# Patient Record
Sex: FEMALE | RURAL
[De-identification: names, ages, dates, MRNs, and addresses within clinical notes are randomized per-mention and may not be internally consistent; named-entity substitution may affect disease eponyms.]

---

## 2022-01-18 ENCOUNTER — APPOINTMENT (OUTPATIENT)
Dept: RURAL CLINIC 11 | Age: 47
Setting detail: DERMATOLOGY
End: 2022-01-18

## 2022-01-18 DIAGNOSIS — L82.1 OTHER SEBORRHEIC KERATOSIS: ICD-10-CM

## 2022-01-18 DIAGNOSIS — Z41.9 ENCOUNTER FOR PROCEDURE FOR PURPOSES OTHER THAN REMEDYING HEALTH STATE, UNSPECIFIED: ICD-10-CM

## 2022-01-18 DIAGNOSIS — L98.8 OTHER SPECIFIED DISORDERS OF THE SKIN AND SUBCUTANEOUS TISSUE: ICD-10-CM

## 2022-01-18 DIAGNOSIS — L71.8 OTHER ROSACEA: ICD-10-CM

## 2022-01-18 DIAGNOSIS — D18.0 HEMANGIOMA: ICD-10-CM

## 2022-01-18 PROBLEM — D18.01 HEMANGIOMA OF SKIN AND SUBCUTANEOUS TISSUE: Status: ACTIVE | Noted: 2022-01-18

## 2022-01-18 PROCEDURE — OTHER MIPS QUALITY: OTHER

## 2022-01-18 PROCEDURE — OTHER SUNSCREEN RECOMMENDATIONS: OTHER

## 2022-01-18 PROCEDURE — OTHER GENTLE SKIN CARE INSTRUCTIONS: OTHER

## 2022-01-18 PROCEDURE — 99203 OFFICE O/P NEW LOW 30 MIN: CPT

## 2022-01-18 PROCEDURE — OTHER PRESCRIPTION: OTHER

## 2022-01-18 PROCEDURE — OTHER OTHER (COSMETIC): OTHER

## 2022-01-18 PROCEDURE — OTHER COUNSELING: OTHER

## 2022-01-18 PROCEDURE — OTHER REASSURANCE: OTHER

## 2022-01-18 PROCEDURE — OTHER ADDITIONAL NOTES: OTHER

## 2022-01-18 PROCEDURE — OTHER MEDICAL CONSULTATION: DYNAMIC RHYTIDES: OTHER

## 2022-01-18 PROCEDURE — OTHER BOTOX: OTHER

## 2022-01-18 PROCEDURE — OTHER RECOMMENDATIONS: OTHER

## 2022-01-18 PROCEDURE — OTHER COSMETIC CONSULTATION: FILLERS: OTHER

## 2022-01-18 RX ORDER — AZELAIC ACID 0.15 G/G
PEA SIZE GEL TOPICAL BID
Qty: 50 | Refills: 5 | Status: ERX | COMMUNITY
Start: 2022-01-18

## 2022-01-18 ASSESSMENT — LOCATION SIMPLE DESCRIPTION DERM
LOCATION SIMPLE: SUPERIOR FOREHEAD
LOCATION SIMPLE: RIGHT FOREHEAD
LOCATION SIMPLE: RIGHT CHEEK
LOCATION SIMPLE: LEFT FOREHEAD
LOCATION SIMPLE: LEFT UPPER ARM
LOCATION SIMPLE: RIGHT UPPER ARM
LOCATION SIMPLE: CHEST
LOCATION SIMPLE: GLABELLA
LOCATION SIMPLE: LEFT CHEEK

## 2022-01-18 ASSESSMENT — LOCATION ZONE DERM
LOCATION ZONE: TRUNK
LOCATION ZONE: ARM
LOCATION ZONE: FACE

## 2022-01-18 ASSESSMENT — LOCATION DETAILED DESCRIPTION DERM
LOCATION DETAILED: RIGHT CENTRAL MALAR CHEEK
LOCATION DETAILED: LEFT INFERIOR MEDIAL FOREHEAD
LOCATION DETAILED: SUPERIOR MID FOREHEAD
LOCATION DETAILED: UPPER STERNUM
LOCATION DETAILED: LEFT ANTERIOR PROXIMAL UPPER ARM
LOCATION DETAILED: LEFT LATERAL BUCCAL CHEEK
LOCATION DETAILED: RIGHT INFERIOR MEDIAL FOREHEAD
LOCATION DETAILED: LEFT INFERIOR CENTRAL MALAR CHEEK
LOCATION DETAILED: RIGHT INFERIOR CENTRAL MALAR CHEEK
LOCATION DETAILED: LEFT CENTRAL MALAR CHEEK
LOCATION DETAILED: GLABELLA
LOCATION DETAILED: RIGHT ANTERIOR DISTAL UPPER ARM

## 2022-01-18 ASSESSMENT — PAIN INTENSITY VAS: HOW INTENSE IS YOUR PAIN 0 BEING NO PAIN, 10 BEING THE MOST SEVERE PAIN POSSIBLE?: NO PAIN

## 2022-01-18 NOTE — PROCEDURE: REASSURANCE
Include Location In Plan?: No
Additional Note: Advised no treatment is necessary. Treatment would be considered cosmetic when lesions are asymptomatic
Detail Level: Zone

## 2022-01-18 NOTE — PROCEDURE: MIPS QUALITY
Quality 110: Preventive Care And Screening: Influenza Immunization: Influenza Immunization Ordered or Recommended, but not Administered due to system reason
Quality 130: Documentation Of Current Medications In The Medical Record: Current Medications Documented
Detail Level: Detailed
Quality 226: Preventive Care And Screening: Tobacco Use: Screening And Cessation Intervention: Patient screened for tobacco use and is an ex/non-smoker

## 2022-01-18 NOTE — PROCEDURE: BOTOX
Show Glabellar Units: Yes
[facility] Units: 0
Show Right And Left Periorbital Units: No
Detail Level: Detailed
Consent: Written consent obtained. Risks include but not limited to lid/brow ptosis, bruising, swelling, diplopia, temporary effect, incomplete chemical denervation.
Post-Care Instructions: Patient instructed to not lie down for 4 hours and limit physical activity for 24 hours. Patient instructed not to travel by airplane for 48 hours.
Dilution (U/0.1 Cc): 1

## 2022-01-18 NOTE — PROCEDURE: ADDITIONAL NOTES
Additional Notes: See scanned attachment
Render Risk Assessment In Note?: no
Detail Level: Simple
Additional Notes: Dharmesh diop

## 2022-02-11 ENCOUNTER — APPOINTMENT (OUTPATIENT)
Dept: RURAL CLINIC 11 | Age: 47
Setting detail: DERMATOLOGY
End: 2022-02-11

## 2022-02-11 DIAGNOSIS — Z41.9 ENCOUNTER FOR PROCEDURE FOR PURPOSES OTHER THAN REMEDYING HEALTH STATE, UNSPECIFIED: ICD-10-CM

## 2022-02-11 DIAGNOSIS — L98.8 OTHER SPECIFIED DISORDERS OF THE SKIN AND SUBCUTANEOUS TISSUE: ICD-10-CM

## 2022-02-11 PROCEDURE — OTHER BOTOX: OTHER

## 2022-02-11 PROCEDURE — OTHER MEDICAL CONSULTATION: DYNAMIC RHYTIDES: OTHER

## 2022-02-11 PROCEDURE — OTHER OTHER (COSMETIC): OTHER

## 2022-02-11 PROCEDURE — OTHER FILLERS: OTHER

## 2022-02-11 PROCEDURE — OTHER ADDITIONAL NOTES: OTHER

## 2022-02-11 ASSESSMENT — LOCATION SIMPLE DESCRIPTION DERM
LOCATION SIMPLE: SUPERIOR FOREHEAD
LOCATION SIMPLE: LEFT FOREHEAD
LOCATION SIMPLE: RIGHT FOREHEAD
LOCATION SIMPLE: RIGHT CHEEK
LOCATION SIMPLE: LEFT CHEEK
LOCATION SIMPLE: GLABELLA
LOCATION SIMPLE: RIGHT LIP

## 2022-02-11 ASSESSMENT — LOCATION DETAILED DESCRIPTION DERM
LOCATION DETAILED: RIGHT NASOLABIAL FOLD
LOCATION DETAILED: RIGHT INFERIOR MEDIAL MALAR CHEEK
LOCATION DETAILED: LEFT INFERIOR MEDIAL MALAR CHEEK
LOCATION DETAILED: LEFT INFERIOR MEDIAL FOREHEAD
LOCATION DETAILED: SUPERIOR MID FOREHEAD
LOCATION DETAILED: RIGHT INFERIOR MEDIAL FOREHEAD
LOCATION DETAILED: GLABELLA

## 2022-02-11 ASSESSMENT — LOCATION ZONE DERM
LOCATION ZONE: LIP
LOCATION ZONE: FACE

## 2022-02-11 NOTE — PROCEDURE: BOTOX
Show Additional Area 5: Yes
Nasal Root Units: 0
Detail Level: Detailed
Dilution (U/0.1 Cc): 1
Show Mentalis Units: No
Post-Care Instructions: Patient instructed to not lie down for 4 hours and limit physical activity for 24 hours. Patient instructed not to travel by airplane for 48 hours.
Consent: Written consent obtained. Risks include but not limited to lid/brow ptosis, bruising, swelling, diplopia, temporary effect, incomplete chemical denervation.

## 2022-02-11 NOTE — PROCEDURE: FILLERS
Additional Area 5 Volume In Cc: 0
Detail Level: Detailed
Include Cannula Information In Note?: No
Topical Anesthesia?: BLT cream (benzocaine 10%, lidocaine 4%, tetracaine 2%)
Filler: Juvederm Vollure XC
Anesthesia Volume In Cc: 0.5
Consent: Written consent obtained. Risks include but not limited to bruising, beading, irregular texture, ulceration, infection, allergic reaction, scar formation, incomplete augmentation, temporary nature, procedural pain.
Post-Care Instructions: Patient instructed to apply ice to reduce swelling.
Additional Anesthesia Volume In Cc: 6
Map Statment: See Attach Map for Complete Details
Anesthesia Type: 1% lidocaine with epinephrine
Filler Comments: See scan

## 2022-04-28 ENCOUNTER — APPOINTMENT (OUTPATIENT)
Dept: RURAL CLINIC 11 | Age: 47
Setting detail: DERMATOLOGY
End: 2022-04-28

## 2022-04-28 DIAGNOSIS — L98.8 OTHER SPECIFIED DISORDERS OF THE SKIN AND SUBCUTANEOUS TISSUE: ICD-10-CM

## 2022-04-28 PROCEDURE — OTHER OTHER (COSMETIC): OTHER

## 2022-04-28 PROCEDURE — OTHER ADDITIONAL NOTES: OTHER

## 2022-04-28 PROCEDURE — OTHER MEDICAL CONSULTATION: DYNAMIC RHYTIDES: OTHER

## 2022-04-28 PROCEDURE — OTHER BOTOX: OTHER

## 2022-04-28 ASSESSMENT — LOCATION DETAILED DESCRIPTION DERM
LOCATION DETAILED: RIGHT INFERIOR MEDIAL FOREHEAD
LOCATION DETAILED: LEFT INFERIOR MEDIAL FOREHEAD
LOCATION DETAILED: SUPERIOR MID FOREHEAD
LOCATION DETAILED: GLABELLA

## 2022-04-28 ASSESSMENT — LOCATION SIMPLE DESCRIPTION DERM
LOCATION SIMPLE: SUPERIOR FOREHEAD
LOCATION SIMPLE: RIGHT FOREHEAD
LOCATION SIMPLE: LEFT FOREHEAD
LOCATION SIMPLE: GLABELLA

## 2022-04-28 ASSESSMENT — LOCATION ZONE DERM: LOCATION ZONE: FACE

## 2022-04-28 NOTE — PROCEDURE: BOTOX
Show Additional Area 2: Yes
Additional Area 5 Units: 0
Show Lcl Units: No
Post-Care Instructions: Patient instructed to not lie down for 4 hours and limit physical activity for 24 hours. Patient instructed not to travel by airplane for 48 hours.
Consent: Written consent obtained. Risks include but not limited to lid/brow ptosis, bruising, swelling, diplopia, temporary effect, incomplete chemical denervation.
Detail Level: Detailed
Dilution (U/0.1 Cc): 1

## 2023-06-02 ENCOUNTER — APPOINTMENT (OUTPATIENT)
Dept: RURAL CLINIC 13 | Age: 48
Setting detail: DERMATOLOGY
End: 2023-06-02

## 2023-06-02 DIAGNOSIS — L82.1 OTHER SEBORRHEIC KERATOSIS: ICD-10-CM

## 2023-06-02 DIAGNOSIS — H01.13 ECZEMATOUS DERMATITIS OF EYELID: ICD-10-CM

## 2023-06-02 DIAGNOSIS — L81.4 OTHER MELANIN HYPERPIGMENTATION: ICD-10-CM

## 2023-06-02 DIAGNOSIS — Z71.89 OTHER SPECIFIED COUNSELING: ICD-10-CM

## 2023-06-02 DIAGNOSIS — F42.4 EXCORIATION (SKIN-PICKING) DISORDER: ICD-10-CM

## 2023-06-02 DIAGNOSIS — T07XXXA INSECT BITE, NONVENOMOUS, OF OTHER, MULTIPLE, AND UNSPECIFIED SITES, WITHOUT MENTION OF INFECTION: ICD-10-CM

## 2023-06-02 DIAGNOSIS — D18.0 HEMANGIOMA: ICD-10-CM

## 2023-06-02 DIAGNOSIS — D22 MELANOCYTIC NEVI: ICD-10-CM

## 2023-06-02 DIAGNOSIS — L71.8 OTHER ROSACEA: ICD-10-CM

## 2023-06-02 PROBLEM — H01.134 ECZEMATOUS DERMATITIS OF LEFT UPPER EYELID: Status: ACTIVE | Noted: 2023-06-02

## 2023-06-02 PROBLEM — D18.01 HEMANGIOMA OF SKIN AND SUBCUTANEOUS TISSUE: Status: ACTIVE | Noted: 2023-06-02

## 2023-06-02 PROBLEM — H01.131 ECZEMATOUS DERMATITIS OF RIGHT UPPER EYELID: Status: ACTIVE | Noted: 2023-06-02

## 2023-06-02 PROBLEM — S50.862A INSECT BITE (NONVENOMOUS) OF LEFT FOREARM, INITIAL ENCOUNTER: Status: ACTIVE | Noted: 2023-06-02

## 2023-06-02 PROBLEM — D22.5 MELANOCYTIC NEVI OF TRUNK: Status: ACTIVE | Noted: 2023-06-02

## 2023-06-02 PROCEDURE — OTHER MEDICATION COUNSELING: OTHER

## 2023-06-02 PROCEDURE — 99214 OFFICE O/P EST MOD 30 MIN: CPT

## 2023-06-02 PROCEDURE — OTHER PRESCRIPTION: OTHER

## 2023-06-02 PROCEDURE — OTHER MIPS QUALITY: OTHER

## 2023-06-02 PROCEDURE — OTHER COUNSELING: OTHER

## 2023-06-02 PROCEDURE — OTHER TREATMENT REGIMEN: OTHER

## 2023-06-02 RX ORDER — AZELAIC ACID 0.15 G/G
GEL TOPICAL
Qty: 50 | Refills: 6 | Status: ERX | COMMUNITY
Start: 2023-06-02

## 2023-06-02 RX ORDER — TRIAMCINOLONE ACETONIDE 1 MG/G
CREAM TOPICAL BID
Qty: 45 | Refills: 1 | Status: ERX | COMMUNITY
Start: 2023-06-02

## 2023-06-02 RX ORDER — DESONIDE 0.5 MG/G
CREAM TOPICAL
Qty: 60 | Refills: 2 | Status: ERX | COMMUNITY
Start: 2023-06-02

## 2023-06-02 ASSESSMENT — LOCATION ZONE DERM
LOCATION ZONE: NECK
LOCATION ZONE: FEET
LOCATION ZONE: FACE
LOCATION ZONE: EYELID
LOCATION ZONE: ARM
LOCATION ZONE: TRUNK

## 2023-06-02 ASSESSMENT — LOCATION DETAILED DESCRIPTION DERM
LOCATION DETAILED: LEFT INFERIOR CENTRAL MALAR CHEEK
LOCATION DETAILED: RIGHT SUPERIOR LATERAL UPPER BACK
LOCATION DETAILED: RIGHT DORSAL FOOT
LOCATION DETAILED: RIGHT LATERAL SUPERIOR EYELID
LOCATION DETAILED: RIGHT LATERAL TRAPEZIAL NECK
LOCATION DETAILED: RIGHT SUPERIOR UPPER BACK
LOCATION DETAILED: LEFT MEDIAL SUPERIOR EYELID
LOCATION DETAILED: LEFT PROXIMAL DORSAL FOREARM

## 2023-06-02 ASSESSMENT — LOCATION SIMPLE DESCRIPTION DERM
LOCATION SIMPLE: LEFT SUPERIOR EYELID
LOCATION SIMPLE: LEFT FOREARM
LOCATION SIMPLE: LEFT CHEEK
LOCATION SIMPLE: RIGHT SUPERIOR EYELID
LOCATION SIMPLE: RIGHT UPPER BACK
LOCATION SIMPLE: RIGHT FOOT
LOCATION SIMPLE: POSTERIOR NECK
LOCATION SIMPLE: RIGHT BACK

## 2023-06-02 NOTE — HPI: RASH (ROSACEA)
Is This A New Presentation, Or A Follow-Up?: Follow Up Rosacea
Additional History: Needing refills on medication

## 2023-06-02 NOTE — PROCEDURE: MEDICATION COUNSELING
Never smoker Opioid Counseling: I discussed with the patient the potential side effects of opioids including but not limited to addiction, altered mental status, and depression. I stressed avoiding alcohol, benzodiazepines, muscle relaxants and sleep aids unless specifically okayed by a physician. The patient verbalized understanding of the proper use and possible adverse effects of opioids. All of the patient's questions and concerns were addressed. They were instructed to flush the remaining pills down the toilet if they did not need them for pain.

## 2023-06-02 NOTE — PROCEDURE: MEDICATION COUNSELING
Xelluiz Pregnancy And Lactation Text: This medication is Pregnancy Category D and is not considered safe during pregnancy.  The risk during breast feeding is also uncertain.